# Patient Record
Sex: MALE | Race: WHITE | NOT HISPANIC OR LATINO | Employment: FULL TIME | URBAN - METROPOLITAN AREA
[De-identification: names, ages, dates, MRNs, and addresses within clinical notes are randomized per-mention and may not be internally consistent; named-entity substitution may affect disease eponyms.]

---

## 2024-06-06 DIAGNOSIS — I10 BENIGN ESSENTIAL HYPERTENSION: Primary | ICD-10-CM

## 2024-06-13 ENCOUNTER — LAB (OUTPATIENT)
Dept: LAB | Facility: CLINIC | Age: 64
End: 2024-06-13
Payer: COMMERCIAL

## 2024-06-13 ENCOUNTER — OFFICE VISIT (OUTPATIENT)
Dept: CARDIOLOGY | Facility: CLINIC | Age: 64
End: 2024-06-13
Payer: COMMERCIAL

## 2024-06-13 VITALS
DIASTOLIC BLOOD PRESSURE: 79 MMHG | SYSTOLIC BLOOD PRESSURE: 120 MMHG | BODY MASS INDEX: 30.96 KG/M2 | HEIGHT: 68 IN | OXYGEN SATURATION: 97 % | WEIGHT: 204.3 LBS | HEART RATE: 76 BPM

## 2024-06-13 DIAGNOSIS — E78.5 HYPERLIPIDEMIA LDL GOAL <100: ICD-10-CM

## 2024-06-13 DIAGNOSIS — I10 BENIGN ESSENTIAL HYPERTENSION: ICD-10-CM

## 2024-06-13 DIAGNOSIS — I10 PRIMARY HYPERTENSION: Primary | ICD-10-CM

## 2024-06-13 DIAGNOSIS — N20.0 KIDNEY STONE: ICD-10-CM

## 2024-06-13 LAB
CREAT UR-MCNC: 92.8 MG/DL
CRP SERPL HS-MCNC: 0.89 MG/L
MICROALBUMIN UR-MCNC: 14.7 MG/L
MICROALBUMIN/CREAT UR: 15.84 MG/G CR (ref 0–17)

## 2024-06-13 PROCEDURE — 93922 UPR/L XTREMITY ART 2 LEVELS: CPT | Performed by: NURSE PRACTITIONER

## 2024-06-13 PROCEDURE — 36415 COLL VENOUS BLD VENIPUNCTURE: CPT | Performed by: PATHOLOGY

## 2024-06-13 PROCEDURE — 86141 C-REACTIVE PROTEIN HS: CPT | Performed by: NURSE PRACTITIONER

## 2024-06-13 PROCEDURE — 93000 ELECTROCARDIOGRAM COMPLETE: CPT | Performed by: INTERNAL MEDICINE

## 2024-06-13 PROCEDURE — 99205 OFFICE O/P NEW HI 60 MIN: CPT | Mod: 25 | Performed by: NURSE PRACTITIONER

## 2024-06-13 PROCEDURE — 82570 ASSAY OF URINE CREATININE: CPT | Performed by: NURSE PRACTITIONER

## 2024-06-13 PROCEDURE — 99000 SPECIMEN HANDLING OFFICE-LAB: CPT | Performed by: PATHOLOGY

## 2024-06-13 RX ORDER — ATORVASTATIN CALCIUM 10 MG/1
TABLET, FILM COATED ORAL
COMMUNITY
Start: 2024-04-18

## 2024-06-13 RX ORDER — LISINOPRIL AND HYDROCHLOROTHIAZIDE 12.5; 2 MG/1; MG/1
1 TABLET ORAL DAILY
COMMUNITY
Start: 2024-04-18

## 2024-06-13 RX ORDER — CHOLECALCIFEROL (VITAMIN D3) 50 MCG
1 TABLET ORAL DAILY
COMMUNITY

## 2024-06-13 RX ORDER — DIPHENOXYLATE HYDROCHLORIDE AND ATROPINE SULFATE 2.5; .025 MG/1; MG/1
1 TABLET ORAL DAILY
COMMUNITY

## 2024-06-13 NOTE — LETTER
6/13/2024      RE: Robin Finney  [id number] [facility] [address]       Dear Colleague,    Thank you for the opportunity to participate in the care of your patient, Robin Finney, at the [facility] FOR CARDIOVASCULAR DISEASE PREVENTION [facility]. Please see a copy of my visit note below.      [facility] for Cardiovascular Disease Prevention - Exam Note    Active Problems   Patient Active Problem List    Diagnosis Date Noted    Hypertension      Priority: Medium    Kidney stone      Priority: Medium       Reason For Visit   Patient here for [facility] early detection of atherosclerosis and CVD exam.    Pain Evaluation  Current history of pain associated with this visit is: denied    Cardiac risk factors:    - age      - smoking      + elevated BMI        + Family history CVD         - Diet           + Hypertension    HPI   Robin Finney is a 63 year old year old male with a history of hypertension, hyperlipidemia, low back pain and a history of kidney stones.  His family history of cardiac disease includes his mother having hypertension, his father had hypertension and hyperlipidemia, his paternal grandfather might have had a MI at age 61.  He takes 10 mg of atorvastatin for the past 2 years, 20/12.5 mg of lisinipril/hydrochlorothiazide and 81 mg asa.   He had a CAC score in 2023, score 1, 0-25th percentile.  He was scheduled for back surgery at [place] this week but has postponed that.  His primary care provider is LUIS Foutnain at [facility] in [place]. 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  Today in clinic he denies chest pain at rest, with activity, while sleeping, SOB at rest, with activity or while sleeping, palpitations, lightheadedness, lower leg edema, calf cramps, indigestion, headaches or issues with his memory that he is  concerned about. .     Nutrition assessment per patient report:   Foods with fat/cholesterol (fried foods, fatty meats, junk food):   1x every 2 weeks emanuel    Fruits and vegetables (  cup cooked, 1 cup raw):  0 servings  Caffeine (1 cup coffee, soda, etc):   0  Alcohol servings (12 oz. beer, 4 oz. wine, 1  oz. in mixed drink):  3 drinks 1x/month  Special dietary habits:  avoids grapefruit, increased fruit intake  Typical breakfast:  cereal-cheerios, wheaties                 Lunch: sandwich, leftovers                 Dinner: chicken, steak, vegetables, ground turkey, whole wheat                 Snacks: rarely                 Drinks:  water, skim milk, OJ  Activity  Patient is is not active     Sleep pattern: good    Laboratory Results Review  We discussed laboratory results today including lipids targets and how foods influence cholesterol.    Weight  His perceived healthy weight is 180 pounds.  A normal BMI of 25 is equal to 164 pounds.  The current BMI of 31.06 is high-risk range.  A weight reduction speed of 2-3 lbs per month for men is recommended.    PMH   Past Medical History:   Diagnosis Date    Chronic kidney disease     Hypertension     Kidney stone        PSH  Past Surgical History:   Procedure Laterality Date    COLONOSCOPY      CYSTOSCOPY, RETROGRADES, INSERT STENT URETER(S), COMBINED  4/9/2013    Procedure: COMBINED CYSTOSCOPY, RETROGRADES, INSERT STENT URETER(S);  CYSTOSCOPY, LEFT RETROGRADE, LEFT STENT PLACEMENT ;  Surgeon: Oliver Sheppard MD;  Location:  OR    ENT SURGERY      oral    EXTRACORPOREAL SHOCK WAVE LITHOTRIPSY (ESWL)  4/17/2013    Procedure: EXTRACORPOREAL SHOCK WAVE LITHOTRIPSY (ESWL);  LEFT EXTRACORPOREAL SHOCK WAVE LITHOTRIPSY (ESWL);  Surgeon: Oliver Sheppard MD;  Location:  OR    HERNIA REPAIR         Current Meds   Current Outpatient Medications   Medication Sig Dispense Refill    atorvastatin (LIPITOR) 10 MG tablet       lisinopril-hydrochlorothiazide (ZESTORETIC) 20-12.5 MG  tablet Take 1 tablet by mouth daily      Multiple Vitamin (MULTI-VITAMINS) TABS Take 1 tablet by mouth daily      vitamin D3 (CHOLECALCIFEROL) 50 mcg (2000 units) tablet Take 1 tablet by mouth daily         Allergies    No Known Allergies    Family Hx   Family History   Problem Relation Age of Onset    Hypertension Mother     Heart Disease Mother         s/p PPM X 15 years    Dementia Father     Hyperlipidemia Father     Hypertension Father     Heart Failure Maternal Grandmother         end of life    Cerebrovascular Disease Maternal Grandfather     Coronary Artery Disease Maternal Grandfather         end of life, cause of death    Dementia Paternal Grandmother     Heart Disease Son         heart murmur    Heart Disease Son         maybe tricuspid valve       Social History    He is , 4 sons    Tobacco History  History   Smoking Status    Never   Smokeless Tobacco    Not on file       ROS  General:  WDWN in NAD  EENT:  Denies visual disturbances, epistaxis, sore throat  Respiratory:  Denies SOB, cough, sputum production  Cardiovascular:  see HPI  GI:  Denies nausea, vomiting, hematemesis, melena  :  denies hematuria, dysuria  Skin:  Denies rashes, lesions or open wounds  Psych:  Pleasant affect    Vital Signs   There were no vitals taken for this visit.      Waist: 41 inches  Hip: 43.5 inches    Physical Exam   In general, the patient is a pleasant male in no apparent distress   HEENT: NC/AT, PERRLA, EOMI, sclerae white, not injected. Nares clear, pharynx without erythema or exudate, dentition intact    Neck: No adenopathy, no thyromegaly, carotids +4/4 bilaterally without bruits,  no jugular venous distension   Lungs: Breath sounds clear bilaterally, without crackles, ronchi, or wheezes  Cor: RRR, S1S2 without murmur, rub, click, or gallop, the PMI is in the 5th ICS in the midclavicular line  Abdomen: Soft, nontender, nondistended, BS+ in all 4 quadrants, without hepatomegaly, no aorta or renal artery  "bruits  Extremities: No clubbing, cyanosis, or edema. DP and PT pulses +2/4 bilaterally    The 10-year ASCVD risk score (Jodie HARRIS Jr., et al., 2013) is: AGUDELO score 4.1%  Values used to calculate the score:   Age: 63 year old   Sex: male   Is Non- : No   Diabetic: No   Tobacco smoker: No   Systolic Blood Press: 120 mmHg   Is BP treated: Yes   HDL Cholesterol: 45 mg/dL   Total Cholesterol: 138 mg/dL    Recent Labs  Lab Results   Component Value Date    GLC 99 04/08/2013      No results found for: \"NTBNP\"  No results found for: \"NTBNPI\"   No results found for: \"UCRR\"   No results found for: \"MICROL\"   No results found for: \"MICROALBUMIN\"   No results found for: \"CRP\"   No results found for: \"CHOL\"   No results found for: \"TRIG\"   No results found for: \"HDL\"   No results found for: \"LDL\"   No results found for: \"VLDL\"   No results found for: \"CHOLHDLRATIO\"  No results found for: \"NHDL\"     Assessment:    Cardiovascular:  Asymptomatic, he is not complaining of chest pain, EKG revealed NSR< HR 68 bpm, small area of plaque detected in carotid arteries, CAC score of 1 in 2023    Blood Pressure:  He takes lisinopril/hydrochlorothiazide 20/12.5 mg, -120/75-79 mmHg    Lipids:  He takes 10 mg of atorvastatin      1 mo ago    Cholesterol  0 - 199 mg/dL 138   Triglyceride  <=149 mg/dL 82   HDL Cholesterol  >=40 mg/dL 45   LDL, Calculated  <130 mg/dL 77   Non HDL Chol, Calculated  <=159 mg/dL 93   Cholesterol/HDL Ratio  <=5.0 3.1   Hours Fasting  8 - 12 Hours 0.1     Glucose: 88 4/24    Sleep pattern:  Sleep hygiene reviewed during clinic visit, handout given to patient    Weight Management: BMI 31.06, recommend referral to comprehensive weight management program    Return to Clinic: 3-5 years    Health Habit Summary:  Nutrition: Heart Healthy Eating:  most of the time   Exercise:  not regularly active  Weight:  high-risk range  Tobacco Use:  never used    This case was presented to Dr. Dewitt and Dr." Jimy Barnhart during our weekly conference.     60 minutes spent on the date of the encounter doing (chart review/review of outside records/review of test results/interpretation of tests/patient visit/documentation/discussion with other provider(s)   FORTUNATO Yuen CNP       CC  Patient Care Team:  Olvin Jacob MD as PCP - General (Internal Medicine)  SELF, REFERRED      [facility] Test Results    WALKING BLOOD PRESSURE RESPONSE (3 minute, 5 MET level walk)   Pre BP: 110/70 mmHg  3 min BP: 140/50 mmHg  1 min post BP: 110/72 mmHg    Pre HR: 80 bpm  3 min HR: 101 bpm  1 min post HR: 72 bpm     Test results: Walking blood pressure response to 3 minutes activity is in borderline  range with increase in blood pressure from the baseline more 30 points.     RETINAL VASCULAR ASSESSMENT   Left Eye Abnormality:  none  AV Ratio: 0.8    Right Eye Abnormality:  none  AV Ratio: 0.8     Retinal Assessment:  normal    ABDOMINAL AORTA ULTRASOUND (< 2.5 normal, borderline 2.5-2.9, abnormal > 3)   SupraIliac 1.68 cm    SupraRenal 1.76 cm    InfraRenal Proximal 1.70 cm    InfraRenal Distal 1.60 cm      Abdominal Aorta Assessment:  normal    LEFT VENTRICULAR ULTRASOUND MEASUREMENTS (adjusted for BSA)  LVIDD 45.6 mm   Septa 9.4 mm   Posterior 9.9 mm     Left Ventricular US Assessment:  normal    Carotid Artery IMT measurements report and plaques in the small area examined:   Left IMT 0.702 mm  Plaques Small heterogeneous plaques in left bulb area size 1.1 - 1.6 mm     Right IMT 0.575 mm  Plaques Small heterogeneous plaque in right bulb area size 1.1 - 1.4 mm      Test results: Carpi arteries wall thicken in is in abnormal range due to plaque formations present.     ECG (see tracing):  normal sinus rhythm;  rate: 76 bpm    Arterial Elasticity per age and gender (see printout):   C1 18.4 mL/mmHg x 10  normal   C2 8.4 mL/mmHg x 100 normal   Supine blood pressure: 110/72 mmHg     Test results: Arterial elasticity of the large and  small size arteries is in normal range after adjusting for age and gender.      [facility] disease score: 3    Lynsey Taveras      Please do not hesitate to contact me if you have any questions/concerns.     Sincerely,     FORTUNATO Yuen CNP

## 2024-06-13 NOTE — PROGRESS NOTES
[facility] for Cardiovascular Disease Prevention - Exam Note    Active Problems   Patient Active Problem List    Diagnosis Date Noted    Hypertension      Priority: Medium    Kidney stone      Priority: Medium       Reason For Visit   Patient here for [facility] early detection of atherosclerosis and CVD exam.    Pain Evaluation  Current history of pain associated with this visit is: denied    Cardiac risk factors:    - age      - smoking      + elevated BMI        + Family history CVD         - Diet           + Hypertension    HPI   Robin Finney is a 63 year old year old male with a history of hypertension, hyperlipidemia, low back pain and a history of kidney stones.  His family history of cardiac disease includes his mother having hypertension, his father had hypertension and hyperlipidemia, his paternal grandfather might have had a MI at age 61.  He takes 10 mg of atorvastatin for the past 2 years, 20/12.5 mg of lisinipril/hydrochlorothiazide and 81 mg asa.   He had a CAC score in 2023, score 1, 0-25th percentile.  He was scheduled for back surgery at [place] this week but has postponed that.  His primary care provider is LUIS Fountain at [facility] in [place]. 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  Today in clinic he denies chest pain at rest, with activity, while sleeping, SOB at rest, with activity or while sleeping, palpitations, lightheadedness, lower leg edema, calf cramps, indigestion, headaches or issues with his memory that he is concerned about. .     Nutrition assessment per patient report:   Foods with fat/cholesterol (fried foods, fatty meats, junk food):   1x every 2 weeks emanuel    Fruits and vegetables (  cup cooked, 1 cup raw):  0 servings  Caffeine (1 cup coffee, soda, etc):   0  Alcohol servings (12 oz. beer, 4 oz. wine, 1  oz. in mixed drink):  3 drinks 1x/month  Special dietary habits:  avoids grapefruit,  increased fruit intake  Typical breakfast:  cereal-cheerios, wheaties                 Lunch: sandwich, leftovers                 Dinner: chicken, steak, vegetables, ground turkey, whole wheat                 Snacks: rarely                 Drinks:  water, skim milk, OJ  Activity  Patient is is not active     Sleep pattern: good    Laboratory Results Review  We discussed laboratory results today including lipids targets and how foods influence cholesterol.    Weight  His perceived healthy weight is 180 pounds.  A normal BMI of 25 is equal to 164 pounds.  The current BMI of 31.06 is high-risk range.  A weight reduction speed of 2-3 lbs per month for men is recommended.    PMH   Past Medical History:   Diagnosis Date    Chronic kidney disease     Hypertension     Kidney stone        PSH  Past Surgical History:   Procedure Laterality Date    COLONOSCOPY      CYSTOSCOPY, RETROGRADES, INSERT STENT URETER(S), COMBINED  4/9/2013    Procedure: COMBINED CYSTOSCOPY, RETROGRADES, INSERT STENT URETER(S);  CYSTOSCOPY, LEFT RETROGRADE, LEFT STENT PLACEMENT ;  Surgeon: Oliver Sheppard MD;  Location:  OR    ENT SURGERY      oral    EXTRACORPOREAL SHOCK WAVE LITHOTRIPSY (ESWL)  4/17/2013    Procedure: EXTRACORPOREAL SHOCK WAVE LITHOTRIPSY (ESWL);  LEFT EXTRACORPOREAL SHOCK WAVE LITHOTRIPSY (ESWL);  Surgeon: Oliver Sheppard MD;  Location:  OR    HERNIA REPAIR         Current Meds   Current Outpatient Medications   Medication Sig Dispense Refill    atorvastatin (LIPITOR) 10 MG tablet       lisinopril-hydrochlorothiazide (ZESTORETIC) 20-12.5 MG tablet Take 1 tablet by mouth daily      Multiple Vitamin (MULTI-VITAMINS) TABS Take 1 tablet by mouth daily      vitamin D3 (CHOLECALCIFEROL) 50 mcg (2000 units) tablet Take 1 tablet by mouth daily         Allergies    No Known Allergies    Family Hx   Family History   Problem Relation Age of Onset    Hypertension Mother     Heart Disease Mother         s/p PPM X 15 years    Dementia Father      Hyperlipidemia Father     Hypertension Father     Heart Failure Maternal Grandmother         end of life    Cerebrovascular Disease Maternal Grandfather     Coronary Artery Disease Maternal Grandfather         end of life, cause of death    Dementia Paternal Grandmother     Heart Disease Son         heart murmur    Heart Disease Son         maybe tricuspid valve       Social History    He is , 4 sons    Tobacco History  History   Smoking Status    Never   Smokeless Tobacco    Not on file       ROS  General:  WDWN in NAD  EENT:  Denies visual disturbances, epistaxis, sore throat  Respiratory:  Denies SOB, cough, sputum production  Cardiovascular:  see HPI  GI:  Denies nausea, vomiting, hematemesis, melena  :  denies hematuria, dysuria  Skin:  Denies rashes, lesions or open wounds  Psych:  Pleasant affect    Vital Signs   There were no vitals taken for this visit.      Waist: 41 inches  Hip: 43.5 inches    Physical Exam   In general, the patient is a pleasant male in no apparent distress   HEENT: NC/AT, PERRLA, EOMI, sclerae white, not injected. Nares clear, pharynx without erythema or exudate, dentition intact    Neck: No adenopathy, no thyromegaly, carotids +4/4 bilaterally without bruits,  no jugular venous distension   Lungs: Breath sounds clear bilaterally, without crackles, ronchi, or wheezes  Cor: RRR, S1S2 without murmur, rub, click, or gallop, the PMI is in the 5th ICS in the midclavicular line  Abdomen: Soft, nontender, nondistended, BS+ in all 4 quadrants, without hepatomegaly, no aorta or renal artery bruits  Extremities: No clubbing, cyanosis, or edema. DP and PT pulses +2/4 bilaterally    The 10-year ASCVD risk score (Jodiemark HARRIS Jr., et al., 2013) is: AGUDELO score 4.1%  Values used to calculate the score:   Age: 63 year old   Sex: male   Is Non- : No   Diabetic: No   Tobacco smoker: No   Systolic Blood Press: 120 mmHg   Is BP treated: Yes   HDL Cholesterol: 45 mg/dL   Total  "Cholesterol: 138 mg/dL    Recent Labs  Lab Results   Component Value Date    GLC 99 04/08/2013      No results found for: \"NTBNP\"  No results found for: \"NTBNPI\"   No results found for: \"UCRR\"   No results found for: \"MICROL\"   No results found for: \"MICROALBUMIN\"   No results found for: \"CRP\"   No results found for: \"CHOL\"   No results found for: \"TRIG\"   No results found for: \"HDL\"   No results found for: \"LDL\"   No results found for: \"VLDL\"   No results found for: \"CHOLHDLRATIO\"  No results found for: \"NHDL\"     Assessment:    Cardiovascular:  Asymptomatic, he is not complaining of chest pain, EKG revealed NSR< HR 68 bpm, small area of plaque detected in carotid arteries, CAC score of 1 in 2023    Blood Pressure:  He takes lisinopril/hydrochlorothiazide 20/12.5 mg, -120/75-79 mmHg    Lipids:  He takes 10 mg of atorvastatin      1 mo ago    Cholesterol  0 - 199 mg/dL 138   Triglyceride  <=149 mg/dL 82   HDL Cholesterol  >=40 mg/dL 45   LDL, Calculated  <130 mg/dL 77   Non HDL Chol, Calculated  <=159 mg/dL 93   Cholesterol/HDL Ratio  <=5.0 3.1   Hours Fasting  8 - 12 Hours 0.1     Glucose: 88 4/24    Sleep pattern:  Sleep hygiene reviewed during clinic visit, handout given to patient    Weight Management: BMI 31.06, recommend referral to comprehensive weight management program    Return to Clinic: 3-5 years    Health Habit Summary:  Nutrition: Heart Healthy Eating:  most of the time   Exercise:  not regularly active  Weight:  high-risk range  Tobacco Use:  never used    This case was presented to Dr. Dewitt and Dr. Jimy Barnhart during our weekly conference.     60 minutes spent on the date of the encounter doing (chart review/review of outside records/review of test results/interpretation of tests/patient visit/documentation/discussion with other provider(s)   FORTUNATO Yuen CNP       CC  Patient Care Team:  Olvin Jacob MD as PCP - General (Internal Medicine)  SELF, REFERRED    "

## 2024-06-14 LAB
ATRIAL RATE - MUSE: 76 BPM
DIASTOLIC BLOOD PRESSURE - MUSE: NORMAL MMHG
INTERPRETATION ECG - MUSE: NORMAL
P AXIS - MUSE: 43 DEGREES
PR INTERVAL - MUSE: 184 MS
QRS DURATION - MUSE: 100 MS
QT - MUSE: 374 MS
QTC - MUSE: 420 MS
R AXIS - MUSE: 13 DEGREES
SYSTOLIC BLOOD PRESSURE - MUSE: NORMAL MMHG
T AXIS - MUSE: 19 DEGREES
VENTRICULAR RATE- MUSE: 76 BPM

## 2024-06-14 NOTE — PROGRESS NOTES
[facility] Test Results    WALKING BLOOD PRESSURE RESPONSE (3 minute, 5 MET level walk)   Pre BP: 110/70 mmHg  3 min BP: 140/50 mmHg  1 min post BP: 110/72 mmHg    Pre HR: 80 bpm  3 min HR: 101 bpm  1 min post HR: 72 bpm     Test results: Walking blood pressure response to 3 minutes activity is in borderline  range with increase in blood pressure from the baseline more 30 points.     RETINAL VASCULAR ASSESSMENT   Left Eye Abnormality:  none  AV Ratio: 0.8    Right Eye Abnormality:  none  AV Ratio: 0.8     Retinal Assessment:  normal    ABDOMINAL AORTA ULTRASOUND (< 2.5 normal, borderline 2.5-2.9, abnormal > 3)   SupraIliac 1.68 cm    SupraRenal 1.76 cm    InfraRenal Proximal 1.70 cm    InfraRenal Distal 1.60 cm      Abdominal Aorta Assessment:  normal    LEFT VENTRICULAR ULTRASOUND MEASUREMENTS (adjusted for BSA)  LVIDD 45.6 mm   Septa 9.4 mm   Posterior 9.9 mm     Left Ventricular US Assessment:  normal    Carotid Artery IMT measurements report and plaques in the small area examined:   Left IMT 0.702 mm  Plaques Small heterogeneous plaques in left bulb area size 1.1 - 1.6 mm     Right IMT 0.575 mm  Plaques Small heterogeneous plaque in right bulb area size 1.1 - 1.4 mm      Test results: Carpi arteries wall thicken in is in abnormal range due to plaque formations present.     ECG (see tracing):  normal sinus rhythm;  rate: 76 bpm    Arterial Elasticity per age and gender (see printout):   C1 18.4 mL/mmHg x 10  normal   C2 8.4 mL/mmHg x 100 normal   Supine blood pressure: 110/72 mmHg     Test results: Arterial elasticity of the large and small size arteries is in normal range after adjusting for age and gender.      [place] disease score: 3    Lynsey Taveras

## 2024-06-20 NOTE — PATIENT INSTRUCTIONS
Screening Results Summary Report     [facility]    Thank you for choosing to participate in the prevention screening offered at the [facility]. Prevention screening is important part of health care.  Atherosclerosis may result in heart attacks, strokes, heart failure, peripheral artery disease and shortened life expectancy. The risk for premature development of this disease is both genetic (family history) and environmental (diet, exercise, lifestyle, etc.).  Goals of your cardiovascular prevention screening include detecting the earliest signs of blood vessel or heart abnormalities, and identifying markers for risk that can be treated if identified early. Recommendations are included to improve health habits. In some cases medication may be recommended to help slow progression of disease. Our goal is to assist you in prevention of a heart attack, stroke and other cardiovascular diseases that are the major cause of illness and mortality in our society.    Your total cholesterol and LDL (bad cholesterol) results are in the  optimal  range. Your other cholesterol numbers are also at goal.  We recommend continuation of ongoing health habit modification (heart healthy nutrition, maintaining your weight within a normal weight range and an exercise routine) to maintain these levels.  An ideal weight range is a body mass index of 25 or less. Continue to take 10 mg of atorvastatin.      2.  A BMI (body mass index) of over 30 increases the risk for heart attack, stroke, and diabetes.  Your BMI is 31.06 and adds to your risk.  A BMI of less than 30 is recommended.  The closer you can get to a BMI of 25 and still feel healthy for your musculature and frame size the better.  Weight reduction, even small amounts (5-10 pounds) can help reduce cholesterol levels.  For men, 2-3 pounds/month and for women, 1-2 pounds/month of weight loss is recommended with an initial goal of  approximately 10% reduction of your body weight per year.  Slow weight loss will increase the chances that you will maintain your weight over time.  A dramatic fluctuation in weight (up and down weight) increases your cardiovascular risk and is not recommended.  Overall, a healthy stable weight is preferred.  We recommend a consultation with the [facility] Comprehensive Weight Management program.  An order has been placed in your electronic medical chart for this consultation. You can schedule the consultation by calling [phone].    3.  Your blood pressure with 3 minutes of moderate (5 met level) treadmill walking increased 30 mmHg (--> systolic-top number) indicating an abnormal rise. This rise in blood pressure can be related to elevated weight, lack of physical exercise/fitness or reduced blood vessel function, which is linked to the presence of or the risk of development of high blood pressure over time. No treatment is needed at this time.      4.  Your Carotid IMT (see test description for explanation) is thickened and increases cardiovascular risk.   In the small area that was visualized, small plaque (fatty deposits) were identified. Good blood flow continues with these small plaques but is it important to prevent further growth over time. Statin cholesterol medications and healthy living habits are important to help prevent growth of plaques over time.  We would like to repeat this measurement with your next exam to see if it is changing over time.    5.  Your diet is fairly heart healthy and well balanced. We recommend increasing your intake of vegetables to 4-5 servings/day and increasing your fruit intake to 3-4 sevings/day. Incorporating healthy fats of the Mediterranean diet into your diet is also a healthy idea. Eating salmon and extra virgin olive oit are good examples of healthy fats. Nutrients found in fruit, vegetables and whole grains have been shown to be beneficial for the long-term health  of your heart and blood vessels. Monitoring your portion sizes is also a good idea.    6.  The American Heart Association recommends 150 minutes of exercise per week, including strength (resistance) training.  Regular exercise can help maintain or lower cholesterol, blood pressure, blood glucose and improve the health of your heart and blood vessels. We recommend increasing your exercise routine to 5 days per week and adding a cardiovascular component to your exercise routine.  Always exercise within your comfort zone (no chest pain, able to talk comfortably).     7.  We suggest that you consider incorporating 4-7-8 relaxation breathing, mindfulness stress reduction, meditation, yoga,and/or aromatherapy into your healthy lifestyle routine. All of these integrative therapies have been shown to be useful in reducing stress and promoting health. The website for the [facility] at the [facility] is [email or web address]. Taking Charge of Your Health and Wellbeing is a wonderful assessment tool to learn more about your wellbeing.    8.  Return to clinic in 3-5 years.    Thank you for choosing to participate in the prevention screening at [facility] CV Prevention clinic.  Cardiovascular prevention screening is important. Atherosclerosis may result in heart attacks, strokes, heart failure, peripheral artery disease.    Wanda Alberto, DNP, APRN, FNP-C